# Patient Record
Sex: FEMALE | Race: WHITE | NOT HISPANIC OR LATINO | URBAN - METROPOLITAN AREA
[De-identification: names, ages, dates, MRNs, and addresses within clinical notes are randomized per-mention and may not be internally consistent; named-entity substitution may affect disease eponyms.]

---

## 2017-06-14 ENCOUNTER — OUTPATIENT (OUTPATIENT)
Dept: OUTPATIENT SERVICES | Facility: HOSPITAL | Age: 67
LOS: 1 days | End: 2017-06-14
Payer: MEDICARE

## 2017-06-14 ENCOUNTER — APPOINTMENT (OUTPATIENT)
Dept: RADIOLOGY | Facility: CLINIC | Age: 67
End: 2017-06-14

## 2017-06-14 ENCOUNTER — APPOINTMENT (OUTPATIENT)
Dept: MAMMOGRAPHY | Facility: CLINIC | Age: 67
End: 2017-06-14

## 2017-06-14 DIAGNOSIS — Z00.8 ENCOUNTER FOR OTHER GENERAL EXAMINATION: ICD-10-CM

## 2017-06-14 PROCEDURE — 77067 SCR MAMMO BI INCL CAD: CPT

## 2017-06-14 PROCEDURE — 77080 DXA BONE DENSITY AXIAL: CPT

## 2017-06-14 PROCEDURE — 77063 BREAST TOMOSYNTHESIS BI: CPT

## 2018-05-04 ENCOUNTER — RESULT REVIEW (OUTPATIENT)
Age: 68
End: 2018-05-04

## 2018-09-24 ENCOUNTER — TRANSCRIPTION ENCOUNTER (OUTPATIENT)
Age: 68
End: 2018-09-24

## 2018-10-27 ENCOUNTER — OUTPATIENT (OUTPATIENT)
Dept: OUTPATIENT SERVICES | Facility: HOSPITAL | Age: 68
LOS: 1 days | End: 2018-10-27
Payer: MEDICARE

## 2018-10-27 ENCOUNTER — APPOINTMENT (OUTPATIENT)
Dept: MAMMOGRAPHY | Facility: CLINIC | Age: 68
End: 2018-10-27
Payer: MEDICARE

## 2018-10-27 DIAGNOSIS — Z00.8 ENCOUNTER FOR OTHER GENERAL EXAMINATION: ICD-10-CM

## 2018-10-27 PROCEDURE — 77063 BREAST TOMOSYNTHESIS BI: CPT | Mod: 26

## 2018-10-27 PROCEDURE — 77063 BREAST TOMOSYNTHESIS BI: CPT

## 2018-10-27 PROCEDURE — 77067 SCR MAMMO BI INCL CAD: CPT | Mod: 26

## 2018-10-27 PROCEDURE — 77067 SCR MAMMO BI INCL CAD: CPT

## 2019-11-01 ENCOUNTER — APPOINTMENT (OUTPATIENT)
Dept: MAMMOGRAPHY | Facility: CLINIC | Age: 69
End: 2019-11-01
Payer: MEDICARE

## 2019-11-01 ENCOUNTER — OUTPATIENT (OUTPATIENT)
Dept: OUTPATIENT SERVICES | Facility: HOSPITAL | Age: 69
LOS: 1 days | End: 2019-11-01
Payer: MEDICARE

## 2019-11-01 ENCOUNTER — APPOINTMENT (OUTPATIENT)
Dept: RADIOLOGY | Facility: CLINIC | Age: 69
End: 2019-11-01
Payer: MEDICARE

## 2019-11-01 DIAGNOSIS — Z00.8 ENCOUNTER FOR OTHER GENERAL EXAMINATION: ICD-10-CM

## 2019-11-01 PROCEDURE — 77067 SCR MAMMO BI INCL CAD: CPT

## 2019-11-01 PROCEDURE — 77063 BREAST TOMOSYNTHESIS BI: CPT

## 2019-11-01 PROCEDURE — 77080 DXA BONE DENSITY AXIAL: CPT

## 2019-11-01 PROCEDURE — 77067 SCR MAMMO BI INCL CAD: CPT | Mod: 26

## 2019-11-01 PROCEDURE — 77063 BREAST TOMOSYNTHESIS BI: CPT | Mod: 26

## 2019-11-01 PROCEDURE — 77080 DXA BONE DENSITY AXIAL: CPT | Mod: 26

## 2020-09-21 ENCOUNTER — EMERGENCY (EMERGENCY)
Facility: HOSPITAL | Age: 70
LOS: 0 days | Discharge: HOME | End: 2020-09-21
Payer: MEDICARE

## 2020-09-21 VITALS
HEART RATE: 75 BPM | TEMPERATURE: 98 F | OXYGEN SATURATION: 97 % | DIASTOLIC BLOOD PRESSURE: 72 MMHG | RESPIRATION RATE: 18 BRPM | SYSTOLIC BLOOD PRESSURE: 133 MMHG

## 2020-09-21 DIAGNOSIS — Z20.828 CONTACT WITH AND (SUSPECTED) EXPOSURE TO OTHER VIRAL COMMUNICABLE DISEASES: ICD-10-CM

## 2020-09-21 DIAGNOSIS — Z01.812 ENCOUNTER FOR PREPROCEDURAL LABORATORY EXAMINATION: ICD-10-CM

## 2020-09-21 DIAGNOSIS — Z91.048 OTHER NONMEDICINAL SUBSTANCE ALLERGY STATUS: ICD-10-CM

## 2020-09-21 PROCEDURE — 99282 EMERGENCY DEPT VISIT SF MDM: CPT

## 2020-09-21 PROCEDURE — U0003: CPT

## 2020-09-21 PROCEDURE — 99283 EMERGENCY DEPT VISIT LOW MDM: CPT | Mod: 25

## 2020-09-21 PROCEDURE — 99283 EMERGENCY DEPT VISIT LOW MDM: CPT | Mod: CS

## 2020-09-21 NOTE — ED STATDOCS - OBJECTIVE STATEMENT
Patient presents to [facility] for screening for COVID-19 for pre-op clearance purposes. Patient is scheduled for right wrist surgery on Thursday at [facility] and needs pre-op COVID test. Patient has no acute S&S of fever, chills, cough, SOB, PORTILLO, n/v/d. Patient may have been exposed to COVID-19. ~Eladio Finley PA-C.

## 2020-09-21 NOTE — ED STATDOCS - PATIENT PORTAL LINK FT
You can access the FollowMyHealth Patient Portal offered by [facility] by registering at the following website: [email or web address]. By joining [employer]’s FollowMyHealth portal, you will also be able to view your health information using other applications (apps) compatible with our system.

## 2020-09-21 NOTE — ED STATDOCS - PHYSICAL EXAMINATION
PA note: Constitutional: NAD AAOx3  Eyes: EOMI, pupils equal  Head: Normocephalic, atraumatic  ENT: Throat is clear without erythema. No exudates. Airway is patent.  Mouth: no airway obstruction.   Cardiac: S1 S2. regular rate   Resp: Non-labored breathing, no tachypnea. Lungs CTA b/l. No w/r/r. Equal chest expansion and rise. O2sat 100% RA  GI: Abd soft. NT/ND.   Neuro: CN2-12 intact. No focal deficits.   Skin: No rashes  ~Eladio Finley PA-C

## 2020-09-21 NOTE — ED STATDOCS - CLINICAL SUMMARY MEDICAL DECISION MAKING FREE TEXT BOX
Patient presents with concerns for COVID exposure.  As patient is nontoxic appearing, will test for COVID and d/c.  Quarantine reviewed and return precautions reviewed. ~Eladio Finley PA-C

## 2020-09-21 NOTE — ED STATDOCS - NS ED ROS FT
ROS: Constitutional- DENIES fever, chills.  Respiratory- DENIES cough, SOB  Cardiac- no chest pain, no palpitations, ENT- DENIES rhinorrhea, no sore throat, no congestion. DENIES loss of sense of smell or taste. Abdomen- No nausea, no vomiting, no diarrhea.  Urinary- no dysuria, no urgency, no frequency.  Skin- No rashes ~Eladio Finley PA-C

## 2020-09-22 LAB — SARS-COV-2 RNA SPEC QL NAA+PROBE: SIGNIFICANT CHANGE UP

## 2020-09-23 ENCOUNTER — TRANSCRIPTION ENCOUNTER (OUTPATIENT)
Age: 70
End: 2020-09-23

## 2020-11-22 ENCOUNTER — OUTPATIENT (OUTPATIENT)
Dept: OUTPATIENT SERVICES | Facility: HOSPITAL | Age: 70
LOS: 1 days | End: 2020-11-22
Payer: MEDICARE

## 2020-11-22 DIAGNOSIS — Z11.59 ENCOUNTER FOR SCREENING FOR OTHER VIRAL DISEASES: ICD-10-CM

## 2020-11-22 LAB — SARS-COV-2 RNA SPEC QL NAA+PROBE: SIGNIFICANT CHANGE UP

## 2020-11-22 PROCEDURE — U0003: CPT

## 2020-11-23 DIAGNOSIS — Z11.59 ENCOUNTER FOR SCREENING FOR OTHER VIRAL DISEASES: ICD-10-CM

## 2020-12-22 ENCOUNTER — OUTPATIENT (OUTPATIENT)
Dept: OUTPATIENT SERVICES | Facility: HOSPITAL | Age: 70
LOS: 1 days | End: 2020-12-22
Payer: MEDICARE

## 2020-12-22 DIAGNOSIS — Z20.828 CONTACT WITH AND (SUSPECTED) EXPOSURE TO OTHER VIRAL COMMUNICABLE DISEASES: ICD-10-CM

## 2020-12-22 LAB — SARS-COV-2 RNA SPEC QL NAA+PROBE: SIGNIFICANT CHANGE UP

## 2020-12-22 PROCEDURE — U0003: CPT

## 2020-12-22 PROCEDURE — C9803: CPT

## 2020-12-23 DIAGNOSIS — Z20.828 CONTACT WITH AND (SUSPECTED) EXPOSURE TO OTHER VIRAL COMMUNICABLE DISEASES: ICD-10-CM

## 2021-04-15 ENCOUNTER — APPOINTMENT (OUTPATIENT)
Dept: MAMMOGRAPHY | Facility: CLINIC | Age: 71
End: 2021-04-15
Payer: MEDICARE

## 2021-04-15 ENCOUNTER — OUTPATIENT (OUTPATIENT)
Dept: OUTPATIENT SERVICES | Facility: HOSPITAL | Age: 71
LOS: 1 days | End: 2021-04-15
Payer: MEDICARE

## 2021-04-15 DIAGNOSIS — Z00.8 ENCOUNTER FOR OTHER GENERAL EXAMINATION: ICD-10-CM

## 2021-04-15 PROCEDURE — 77067 SCR MAMMO BI INCL CAD: CPT

## 2021-04-15 PROCEDURE — 77063 BREAST TOMOSYNTHESIS BI: CPT | Mod: 26

## 2021-04-15 PROCEDURE — 77063 BREAST TOMOSYNTHESIS BI: CPT

## 2021-04-15 PROCEDURE — 77067 SCR MAMMO BI INCL CAD: CPT | Mod: 26

## 2022-01-27 ENCOUNTER — OUTPATIENT (OUTPATIENT)
Dept: OUTPATIENT SERVICES | Facility: HOSPITAL | Age: 72
LOS: 1 days | End: 2022-01-27
Payer: MEDICARE

## 2022-01-27 ENCOUNTER — APPOINTMENT (OUTPATIENT)
Age: 72
End: 2022-01-27
Payer: MEDICARE

## 2022-01-27 DIAGNOSIS — Z00.8 ENCOUNTER FOR OTHER GENERAL EXAMINATION: ICD-10-CM

## 2022-01-27 PROCEDURE — 71046 X-RAY EXAM CHEST 2 VIEWS: CPT | Mod: 26

## 2022-01-27 PROCEDURE — 71046 X-RAY EXAM CHEST 2 VIEWS: CPT

## 2022-02-10 ENCOUNTER — APPOINTMENT (OUTPATIENT)
Dept: GASTROENTEROLOGY | Facility: CLINIC | Age: 72
End: 2022-02-10
Payer: MEDICARE

## 2022-02-10 VITALS
BODY MASS INDEX: 27.99 KG/M2 | DIASTOLIC BLOOD PRESSURE: 72 MMHG | WEIGHT: 168 LBS | SYSTOLIC BLOOD PRESSURE: 118 MMHG | HEIGHT: 65 IN

## 2022-02-10 DIAGNOSIS — R12 HEARTBURN: ICD-10-CM

## 2022-02-10 PROCEDURE — 99214 OFFICE O/P EST MOD 30 MIN: CPT

## 2022-02-10 RX ORDER — SODIUM PICOSULFATE, MAGNESIUM OXIDE, AND ANHYDROUS CITRIC ACID 10; 3.5; 12 MG/160ML; G/160ML; G/160ML
10-3.5-12 MG-GM LIQUID ORAL
Qty: 2 | Refills: 0 | Status: ACTIVE | COMMUNITY
Start: 2022-02-10 | End: 1900-01-01

## 2022-02-10 RX ORDER — OMEPRAZOLE 40 MG/1
40 CAPSULE, DELAYED RELEASE ORAL
Qty: 30 | Refills: 5 | Status: ACTIVE | COMMUNITY
Start: 2022-02-10 | End: 1900-01-01

## 2022-02-10 NOTE — HISTORY OF PRESENT ILLNESS
[FreeTextEntry1] : 72 y/o female presenting c/o heartburn. Reports over the last 4 months she has had a frequent cough which she believes is from acid reflux. She has been taking OTC omeprazole daily with minimal relief. Saw her PMD who also thought she may have a post nasal drip, and recommended she see ENT as well.  LAst EGD was in 2018 which showed some gastritis. Reports she is due for another screening colonoscopy this year. Denies chest pain, shortness of breath, vomiting, abdominal pain, diarrhea, constipation, melena, hematochezia, unintentional weight changes, fevers, chills.

## 2022-02-10 NOTE — REVIEW OF SYSTEMS
[Cough] : cough [As Noted in HPI] : as noted in HPI [Heartburn] : heartburn [Negative] : Psychiatric

## 2022-02-10 NOTE — ASSESSMENT
[FreeTextEntry1] : 72 y/o female presenting c/o heartburn despite daily PPI use for the last 4 months. Also has appointment with ENT to evaluate cough in the coming weeks. Last EGD/COlon was in 2018 which showed gastritis, and multiple colon polyps. \par \par Will plan for EGD. Procedure, prep, and risks discussed. Patient verbalized understanding. \par Will plan for colonoscopy. Discussed with patient prep, procedure, and risks. Verbalized understanding. Prep sent to pharmacy. \par \par Will take omeprazole 40mg daily, and will increase to BID if symptoms persist.\par \par All questions answered. \par \par Discussed with Dr. Mancera.

## 2022-02-15 ENCOUNTER — NON-APPOINTMENT (OUTPATIENT)
Age: 72
End: 2022-02-15

## 2022-02-17 ENCOUNTER — APPOINTMENT (OUTPATIENT)
Dept: OTOLARYNGOLOGY | Facility: CLINIC | Age: 72
End: 2022-02-17
Payer: MEDICARE

## 2022-02-17 VITALS — WEIGHT: 168 LBS | BODY MASS INDEX: 27.99 KG/M2 | TEMPERATURE: 97.3 F | HEIGHT: 65 IN

## 2022-02-17 DIAGNOSIS — J37.0 CHRONIC LARYNGITIS: ICD-10-CM

## 2022-02-17 PROCEDURE — 31575 DIAGNOSTIC LARYNGOSCOPY: CPT

## 2022-02-17 PROCEDURE — 99204 OFFICE O/P NEW MOD 45 MIN: CPT | Mod: 25

## 2022-02-17 RX ORDER — BIOTIN 10 MG
TABLET ORAL
Refills: 0 | Status: ACTIVE | COMMUNITY

## 2022-02-17 RX ORDER — PREDNISONE 20 MG/1
20 TABLET ORAL
Qty: 21 | Refills: 1 | Status: ACTIVE | COMMUNITY
Start: 2022-02-17 | End: 1900-01-01

## 2022-02-17 RX ORDER — FAMOTIDINE 40 MG/1
40 TABLET, FILM COATED ORAL
Qty: 30 | Refills: 5 | Status: ACTIVE | COMMUNITY
Start: 2022-02-17 | End: 1900-01-01

## 2022-02-17 NOTE — HISTORY OF PRESENT ILLNESS
[de-identified] : co cough past 3 mo initially started w uri ot covid\par co excessive mucous in throat\par co hoarseness, no dysphagia\par hx reflux using omeprazole 20 q d  last few days 40 mg, no nasal congestion\par ?pnd clear, neg hx sinusitis, allergy testing neg\par cxr neg, no hx asthma

## 2022-02-17 NOTE — CONSULT LETTER
[Dear  ___] : Dear  [unfilled], [Consult Letter:] : I had the pleasure of evaluating your patient, [unfilled]. [Please see my note below.] : Please see my note below. [Consult Closing:] : Thank you very much for allowing me to participate in the care of this patient.  If you have any questions, please do not hesitate to contact me. [Sincerely,] : Sincerely, [FreeTextEntry3] : Napoleon Fernandez MD, FACS\par

## 2022-02-17 NOTE — ASSESSMENT
[FreeTextEntry1] : mild vocal cord swelling and hoarseness\par tracheitis and cough, post viral\par ?laryngeal reflux although no sign in larynx\par possible neurogenic cough\par add famotidine 40 q hs\par pred 20 bid and taper\par fu 3 weeks

## 2022-02-17 NOTE — PROCEDURE
[de-identified] : Indication for procedure:Unable to examine laryngeal structures with mirror exam.  Difficulty w persistent throat discomfort and excessive\par throat clearing\par The patient has \par \par Scope # \par Topical anesthesia with viscous xylocaine 2% is applied to the anterior nares.\par A flexible fiberoptic laryngoscope is than introduced through the nares.\par The nasopharynx is clear without mass or inflammation.\par The posterior pharyngeal wall is unremarkable.\par The tongue base and vallecula are unremarkable.  The hypopharynx is unremarkable and unobstructed.\par The supraglottic larynx is within normal limits.\par Both vocal cords are fully mobile with left mild vc swelling\par There is no edema or erythema overlying the arytenoid cartilages or the inter-arytenoid space.\par The subglottic space is clear.\par The voice has a raspy quality.\par

## 2022-03-10 ENCOUNTER — APPOINTMENT (OUTPATIENT)
Dept: OTOLARYNGOLOGY | Facility: CLINIC | Age: 72
End: 2022-03-10
Payer: MEDICARE

## 2022-03-10 VITALS — WEIGHT: 168 LBS | BODY MASS INDEX: 27.99 KG/M2 | TEMPERATURE: 96.5 F | HEIGHT: 65 IN

## 2022-03-10 DIAGNOSIS — R05.9 COUGH, UNSPECIFIED: ICD-10-CM

## 2022-03-10 DIAGNOSIS — R68.89 OTHER GENERAL SYMPTOMS AND SIGNS: ICD-10-CM

## 2022-03-10 DIAGNOSIS — K21.9 GASTRO-ESOPHAGEAL REFLUX DISEASE W/OUT ESOPHAGITIS: ICD-10-CM

## 2022-03-10 DIAGNOSIS — J04.10 ACUTE TRACHEITIS W/OUT OBSTRUCTION: ICD-10-CM

## 2022-03-10 PROCEDURE — 99213 OFFICE O/P EST LOW 20 MIN: CPT

## 2022-03-10 RX ORDER — BENZONATATE 200 MG/1
200 CAPSULE ORAL
Qty: 90 | Refills: 3 | Status: ACTIVE | COMMUNITY
Start: 2022-03-10 | End: 1900-01-01

## 2022-03-10 RX ORDER — GABAPENTIN 100 MG/1
100 CAPSULE ORAL 3 TIMES DAILY
Qty: 90 | Refills: 0 | Status: ACTIVE | COMMUNITY
Start: 2022-03-10 | End: 1900-01-01

## 2022-03-10 NOTE — HISTORY OF PRESENT ILLNESS
[de-identified] : co cough but somewhat better\par could not tolerate pred or famotidine - caused pt anxiety\par dc after one week

## 2022-03-10 NOTE — ASSESSMENT
[FreeTextEntry1] : cough\par may have had benefit from pred and famotidine but prefers not to take\par suspect neurogenic component\par trial benzonatate 200 tid\par gabapentin 100 tid\par fu 4 weeks

## 2022-03-21 ENCOUNTER — APPOINTMENT (OUTPATIENT)
Dept: GASTROENTEROLOGY | Facility: AMBULATORY MEDICAL SERVICES | Age: 72
End: 2022-03-21
Payer: MEDICARE

## 2022-03-21 PROCEDURE — 43239 EGD BIOPSY SINGLE/MULTIPLE: CPT

## 2022-03-21 PROCEDURE — 45385 COLONOSCOPY W/LESION REMOVAL: CPT

## 2022-04-18 ENCOUNTER — OUTPATIENT (OUTPATIENT)
Dept: OUTPATIENT SERVICES | Facility: HOSPITAL | Age: 72
LOS: 1 days | End: 2022-04-18
Payer: MEDICARE

## 2022-04-18 ENCOUNTER — APPOINTMENT (OUTPATIENT)
Dept: MAMMOGRAPHY | Facility: CLINIC | Age: 72
End: 2022-04-18
Payer: MEDICARE

## 2022-04-18 DIAGNOSIS — Z00.8 ENCOUNTER FOR OTHER GENERAL EXAMINATION: ICD-10-CM

## 2022-04-18 PROCEDURE — 77067 SCR MAMMO BI INCL CAD: CPT | Mod: 26

## 2022-04-18 PROCEDURE — 77063 BREAST TOMOSYNTHESIS BI: CPT | Mod: 26

## 2022-04-18 PROCEDURE — 77067 SCR MAMMO BI INCL CAD: CPT

## 2022-04-18 PROCEDURE — 77063 BREAST TOMOSYNTHESIS BI: CPT

## 2022-04-21 ENCOUNTER — APPOINTMENT (OUTPATIENT)
Dept: OTOLARYNGOLOGY | Facility: CLINIC | Age: 72
End: 2022-04-21

## 2022-06-13 NOTE — H&P ADULT - NSHPLABSRESULTS_GEN_ALL_CORE
5/5/22- EKG   SR rate 70    5/5/22Echo  LVEF 60-65%     4/28/2022- Carotid Artery  Mild atherosclerosis

## 2022-06-13 NOTE — H&P ADULT - ASSESSMENT
72 y/o f with PMH HLD, OA, CAD, GERD presents to cardiology for follow up from 2018 where pt was experiencing unstable angina, PET (2018) revealing no evidence of ischemia. Pt referred to cardiac cath for further evaluation. -Consent obtained for cardiac catheterization w/ coronary angiogram and possible stent placement. Pt is competent, has capacity, and understands risks and benefits of procedure. Risks and benefits discussed. Risk discussed included, but not limited to MI, stroke, mortality, major bleeding, arrythmia, or infection. All questions answered     ASA class:  Creatinine:  GFR:  Bleeding  Risk score:  Hans score   72 y/o f with PMH HLD,  CAD, GERD presents to cardiology for follow up from 2018 where pt was experiencing unstable angina, PET (2018) revealing no evidence of ischemia. Pt referred to cardiac cath for further evaluation. -Consent obtained for cardiac catheterization w/ coronary angiogram and possible stent placement. Pt is competent, has capacity, and understands risks and benefits of procedure. Risks and benefits discussed. Risk discussed included, but not limited to MI, stroke, mortality, major bleeding, arrythmia, or infection. All questions answered     ASA class:  Creatinine:  GFR:  Bleeding  Risk score:  Hans score   70 y/o f with PMH HLD, CAD, GERD presents to cardiology for follow up from 2018 where pt was experiencing unstable angina, PET (2018) revealing no evidence of ischemia. Pt. had her annual PET which was found to be abnormal.  Pt. now referred for cardiac cath for further evaluation.     -Consent obtained for cardiac catheterization w/ coronary angiogram and possible stent placement. Pt is competent, has capacity, and understands risks and benefits of procedure. Risks and benefits discussed. Risk discussed included, but not limited to MI, stroke, mortality, major bleeding, arrythmia, or infection. All questions answered     ASA class: II  Creatinine: 0.78  GFR: 87  Bleeding Risk score: 1.7  Hans score: 1 point MARK risk score

## 2022-06-13 NOTE — H&P ADULT - NSICDXPASTMEDICALHX_GEN_ALL_CORE_FT
PAST MEDICAL HISTORY:  CAD (coronary artery disease)     HLD (hyperlipidemia)     Osteoarthritis      PAST MEDICAL HISTORY:  CAD (coronary artery disease)     HLD (hyperlipidemia)

## 2022-06-13 NOTE — H&P ADULT - HISTORY OF PRESENT ILLNESS
70 y/o f with PMH HLD, OA, CAD, GERD presents to cardiology for follow up from 2018 where pt was experiencing unstable angina, PET (2018) revealing no evidence of ischemia. Pt referred to cardiac cath for further evaluation.   Covid neg PST  70 y/o f with PMH HLD, CAD, GERD presents to cardiology for follow up from 2018 where pt was experiencing unstable angina, PET (2018) revealing no evidence of ischemia. Pt referred to cardiac cath for further evaluation.   Covid neg PST  70 y/o f with PMH HLD, CAD, GERD presents to cardiology for follow up from 2018 where pt was experiencing unstable angina, PET (2018) revealing no evidence of ischemia. Pt. had her annual PET which was found to be abnormal.  Pt. now referred for cardiac cath for further evaluation.  Pt. denies chest pain, SOB, palpitations lightheadedness, dizziness,   Covid neg PST

## 2022-06-14 ENCOUNTER — OUTPATIENT (OUTPATIENT)
Dept: OUTPATIENT SERVICES | Facility: HOSPITAL | Age: 72
LOS: 1 days | Discharge: HOME | End: 2022-06-14
Payer: MEDICARE

## 2022-06-14 VITALS
HEART RATE: 71 BPM | OXYGEN SATURATION: 95 % | DIASTOLIC BLOOD PRESSURE: 65 MMHG | WEIGHT: 173.06 LBS | TEMPERATURE: 98 F | RESPIRATION RATE: 18 BRPM | HEIGHT: 65.5 IN | SYSTOLIC BLOOD PRESSURE: 142 MMHG

## 2022-06-14 VITALS — OXYGEN SATURATION: 97 % | SYSTOLIC BLOOD PRESSURE: 137 MMHG | HEART RATE: 70 BPM | RESPIRATION RATE: 18 BRPM

## 2022-06-14 DIAGNOSIS — I20.0 UNSTABLE ANGINA: ICD-10-CM

## 2022-06-14 DIAGNOSIS — R94.39 ABNORMAL RESULT OF OTHER CARDIOVASCULAR FUNCTION STUDY: ICD-10-CM

## 2022-06-14 PROCEDURE — 99153 MOD SED SAME PHYS/QHP EA: CPT

## 2022-06-14 PROCEDURE — C1894: CPT

## 2022-06-14 PROCEDURE — C1887: CPT

## 2022-06-14 PROCEDURE — 93005 ELECTROCARDIOGRAM TRACING: CPT | Mod: XU

## 2022-06-14 PROCEDURE — 99152 MOD SED SAME PHYS/QHP 5/>YRS: CPT

## 2022-06-14 PROCEDURE — 93010 ELECTROCARDIOGRAM REPORT: CPT

## 2022-06-14 PROCEDURE — 93458 L HRT ARTERY/VENTRICLE ANGIO: CPT

## 2022-06-14 PROCEDURE — C1769: CPT

## 2022-06-14 RX ORDER — CHOLECALCIFEROL (VITAMIN D3) 125 MCG
1 CAPSULE ORAL
Qty: 0 | Refills: 0 | DISCHARGE

## 2022-06-14 RX ORDER — SODIUM CHLORIDE 9 MG/ML
1000 INJECTION INTRAMUSCULAR; INTRAVENOUS; SUBCUTANEOUS
Refills: 0 | Status: DISCONTINUED | OUTPATIENT
Start: 2022-06-14 | End: 2022-06-14

## 2022-06-14 RX ORDER — ROSUVASTATIN CALCIUM 5 MG/1
1 TABLET ORAL
Qty: 0 | Refills: 0 | DISCHARGE

## 2022-06-14 RX ORDER — SODIUM CHLORIDE 9 MG/ML
250 INJECTION INTRAMUSCULAR; INTRAVENOUS; SUBCUTANEOUS ONCE
Refills: 0 | Status: COMPLETED | OUTPATIENT
Start: 2022-06-14 | End: 2022-06-14

## 2022-06-14 RX ORDER — ESCITALOPRAM OXALATE 10 MG/1
1 TABLET, FILM COATED ORAL
Qty: 0 | Refills: 0 | DISCHARGE

## 2022-06-14 RX ADMIN — SODIUM CHLORIDE 237 MILLILITER(S): 9 INJECTION INTRAMUSCULAR; INTRAVENOUS; SUBCUTANEOUS at 16:27

## 2022-06-14 RX ADMIN — SODIUM CHLORIDE 500 MILLILITER(S): 9 INJECTION INTRAMUSCULAR; INTRAVENOUS; SUBCUTANEOUS at 15:59

## 2022-06-14 NOTE — ASU PATIENT PROFILE, ADULT - ABILITY TO HEAR (WITH HEARING AID OR HEARING APPLIANCE IF NORMALLY USED):
hearing aids b/l/Mildly to Moderately Impaired: difficulty hearing in some environments or speaker may need to increase volume or speak distinctly

## 2022-06-14 NOTE — CHART NOTE - NSCHARTNOTEFT_GEN_A_CORE
Preop Phone Call:  - Able to Reach Patient:  yes  - Info given to:	patient having cardiac catheterization  -  and allergies confirmed: yes  - Medication Information Given: yes  - Medications To Take (specify)	Ok to take a.m. meds w/sip of water  - Medications To Hold (Specify) none	  - Arrival Time: 1315  - NPO after:ok to eat light breakfast   - Understanding of Information Verbalized: yes  will have a  over the age of 18  for d/c home  - Understanding of possible overnight stay if stent is placed: yes
Nurse Practitioner Progress note:     HPI:  70 y/o f with PMH HLD, CAD, GERD presents to cardiology for follow up from 2018 where pt was experiencing unstable angina, PET (2018) revealing no evidence of ischemia. Pt. had her annual PET which was found to be abnormal.  Pt. now referred for cardiac cath for further evaluation.  Pt. denies chest pain, SOB, palpitations lightheadedness, dizziness,   Covid neg PST  (13 Jun 2022 13:22)    T(C): 36.7 (06-14-22 @ 14:01), Max: 36.7 (06-14-22 @ 14:01)  HR: 62 (06-14-22 @ 15:50) (62 - 71)  BP: 137/70 (06-14-22 @ 15:50) (126/71 - 142/65)  RR: 18 (06-14-22 @ 15:50) (18 - 18)  SpO2: 94% (06-14-22 @ 15:50) (94% - 95%)  Wt(kg): --    PHYSICAL EXAM:  Neurologic: Non-focal, AxOx3.  No neuro deficits  Vascular: Peripheral pulses palpable 2+ bilaterally  Procedure Site: Rt. radial band in place site benign soft no bleeding no hematoma +1 radial pulse no c/o numbness/tingling, <3sec cap refill, fingers/hand warm to touch     12 lead EKG:  	    LABS:	 	    PROCEDURE RESULTS:  S/P [facility] non-obstructive CAD           ASSESSMENT/PLAN: 	  70 y/o f with PMH HLD, CAD, GERD presents to cardiology for follow up from 2018 where pt was experiencing unstable angina, PET (2018) revealing no evidence of ischemia. Pt. had her annual PET which was found to be abnormal.  Pt. now referred for cardiac cath for further evaluation.  S/P [facility] S/P [facility]IS     -VS, labs, diet, activity as per post cath orders  -IV hydration post hydration IV fluids ordered 0.9% NS @ 237cc/hr x4 hours   -Encourage PO fluids  -Continue current medications  -Pt. to be discharged home today  -Plan of care D/W pt. and MD  -Post cath instructions reviewed with pt., pt. verbalizes and understands instructions  -Follow-up with attending

## 2022-06-14 NOTE — PACU DISCHARGE NOTE - COMMENTS
Pt alert and oriented x 4, no c/o chest pain or sob.  Right Radial site intact with pressure dsg, no s/s bleeding or hematoma.  Vital signs stable. no distress.  radial cath site benign. soft to touch nontender. +2 palpable radial/ulnar pulses bilaterally pt denies pain/numbness. extremity . PIV dc'd, dsg applied, no s/s bleeding or redness.  Patient discharge home as per orders. pt A/Ox4. Vital signs stable. PIVL removed. No evidence of infiltration noted at discharge.  Patient discharge home as per orders. pt A/Ox4. Vital signs stable. PIVL removed. No evidence of infiltration noted at discharge. Patient skin intact,  pt ambulating around unit tolerating well. All paperwork instructions given, pt to follow up as directed patient verbalized understanding to all and without concerns at this time Pt alert and oriented x 4, no c/o chest pain or sob.  Right Radial site intact with pressure dsg, no s/s bleeding or hematoma.  Vital signs stable. no distress.  radial cath site benign. soft to touch nontender. +2 palpable radial/ulnar pulses bilaterally pt denies pain/numbness. extremity . PIV dc'd, dsg applied, no s/s bleeding or redness.  Patient discharge home as per orders.  PIVL removed. No evidence of infiltration noted at discharge.  Patient discharge home as per orders.   Patient skin intact,  pt ambulating around unit tolerating well. All paperwork instructions given, pt to follow up as directed patient verbalized understanding to all and without concerns at this time

## 2022-06-21 DIAGNOSIS — Z86.79 PERSONAL HISTORY OF OTHER DISEASES OF THE CIRCULATORY SYSTEM: ICD-10-CM

## 2022-06-21 DIAGNOSIS — I20.0 UNSTABLE ANGINA: ICD-10-CM

## 2022-06-21 DIAGNOSIS — I10 ESSENTIAL (PRIMARY) HYPERTENSION: ICD-10-CM

## 2022-08-19 PROBLEM — E78.5 HYPERLIPIDEMIA, UNSPECIFIED: Chronic | Status: ACTIVE | Noted: 2022-06-13

## 2022-08-19 PROBLEM — I25.10 ATHEROSCLEROTIC HEART DISEASE OF NATIVE CORONARY ARTERY WITHOUT ANGINA PECTORIS: Chronic | Status: ACTIVE | Noted: 2022-06-13

## 2022-08-20 ENCOUNTER — OUTPATIENT (OUTPATIENT)
Dept: OUTPATIENT SERVICES | Facility: HOSPITAL | Age: 72
LOS: 1 days | End: 2022-08-20
Payer: MEDICARE

## 2022-08-20 ENCOUNTER — APPOINTMENT (OUTPATIENT)
Dept: RADIOLOGY | Facility: CLINIC | Age: 72
End: 2022-08-20

## 2022-08-20 DIAGNOSIS — Z00.8 ENCOUNTER FOR OTHER GENERAL EXAMINATION: ICD-10-CM

## 2022-08-20 PROCEDURE — 77080 DXA BONE DENSITY AXIAL: CPT | Mod: 26

## 2022-08-20 PROCEDURE — 77080 DXA BONE DENSITY AXIAL: CPT

## 2022-08-24 ENCOUNTER — APPOINTMENT (OUTPATIENT)
Dept: GASTROENTEROLOGY | Facility: CLINIC | Age: 72
End: 2022-08-24

## 2022-08-24 VITALS
DIASTOLIC BLOOD PRESSURE: 96 MMHG | WEIGHT: 170 LBS | SYSTOLIC BLOOD PRESSURE: 160 MMHG | HEIGHT: 65 IN | BODY MASS INDEX: 28.32 KG/M2 | HEART RATE: 74 BPM

## 2022-08-24 DIAGNOSIS — K57.32 DIVERTICULITIS OF LARGE INTESTINE W/OUT PERFORATION OR ABSCESS W/OUT BLEEDING: ICD-10-CM

## 2022-08-24 DIAGNOSIS — R10.32 LEFT LOWER QUADRANT PAIN: ICD-10-CM

## 2022-08-24 PROCEDURE — 99213 OFFICE O/P EST LOW 20 MIN: CPT

## 2022-08-25 PROBLEM — R10.32 ABDOMINAL PAIN, LLQ: Status: ACTIVE | Noted: 2022-08-25

## 2022-08-25 PROBLEM — K57.32 DIVERTICULITIS OF SIGMOID COLON: Status: ACTIVE | Noted: 2022-08-25

## 2022-08-25 NOTE — PHYSICAL EXAM
[General Appearance - Alert] : alert [General Appearance - In No Acute Distress] : in no acute distress [Auscultation Breath Sounds / Voice Sounds] : lungs were clear to auscultation bilaterally [Heart Rate And Rhythm] : heart rate was normal and rhythm regular [Heart Sounds] : normal S1 and S2 [Heart Sounds Gallop] : no gallops [Murmurs] : no murmurs [Heart Sounds Pericardial Friction Rub] : no pericardial rub [Bowel Sounds] : normal bowel sounds [Abdomen Soft] : soft [] : no hepato-splenomegaly [Abdomen Mass (___ Cm)] : no abdominal mass palpated [FreeTextEntry1] : mildly tender llq [Abnormal Walk] : normal gait [Nail Clubbing] : no clubbing  or cyanosis of the fingernails [Musculoskeletal - Swelling] : no joint swelling seen [Motor Tone] : muscle strength and tone were normal [Oriented To Time, Place, And Person] : oriented to person, place, and time [Impaired Insight] : insight and judgment were intact [Affect] : the affect was normal

## 2022-08-25 NOTE — ASSESSMENT
[FreeTextEntry1] : 72yo female with resolving diverticulitis\par \par She still has some residual discomfort- expect this will continue to improve over time\par Low-residue diet for now, gradually re-add fiber after several more weeks\par \par if she develops recurrent pain, will call and consider repeat ct scan r/o abscess

## 2022-08-25 NOTE — HISTORY OF PRESENT ILLNESS
[de-identified] : 72yo female with diverticulitis\par \par She presented couple of weeks ago with LLQ pain\par She had CT scan which showed diverticulitis after visit to ER. She was given abx and just completed 10 day course. She is feeling better overall but still with mild LLQ discomfort

## 2022-09-08 ENCOUNTER — APPOINTMENT (OUTPATIENT)
Dept: GASTROENTEROLOGY | Facility: CLINIC | Age: 72
End: 2022-09-08

## 2022-09-08 VITALS
BODY MASS INDEX: 28.32 KG/M2 | HEART RATE: 80 BPM | DIASTOLIC BLOOD PRESSURE: 78 MMHG | WEIGHT: 170 LBS | SYSTOLIC BLOOD PRESSURE: 141 MMHG | HEIGHT: 65 IN

## 2022-09-08 DIAGNOSIS — K58.0 IRRITABLE BOWEL SYNDROME WITH DIARRHEA: ICD-10-CM

## 2022-09-08 PROCEDURE — 99214 OFFICE O/P EST MOD 30 MIN: CPT

## 2022-09-08 RX ORDER — RIFAXIMIN 550 MG/1
550 TABLET ORAL
Qty: 42 | Refills: 2 | Status: ACTIVE | COMMUNITY
Start: 2022-09-08 | End: 1900-01-01

## 2022-09-24 NOTE — HISTORY OF PRESENT ILLNESS
[de-identified] : 70yo female with diverticulitis\par \par Her diverticulitis symptoms have improved\par She is now back to her baseline symptoms of diarrhea and abdominal pain c/w IBS diarrhea

## 2022-09-24 NOTE — ASSESSMENT
[FreeTextEntry1] : 70yo female with IBS-diarrhea, recent diverticulitis\par \par will try xifaxan for IBS-D\par we discussed diet